# Patient Record
Sex: MALE | Race: ASIAN | Employment: OTHER | URBAN - METROPOLITAN AREA
[De-identification: names, ages, dates, MRNs, and addresses within clinical notes are randomized per-mention and may not be internally consistent; named-entity substitution may affect disease eponyms.]

---

## 2017-10-04 PROCEDURE — 84153 ASSAY OF PSA TOTAL: CPT | Performed by: INTERNAL MEDICINE

## 2017-10-05 PROBLEM — R73.09 ELEVATED GLUCOSE: Status: ACTIVE | Noted: 2017-10-05

## 2017-10-05 PROBLEM — E66.3 OVERWEIGHT (BMI 25.0-29.9): Status: ACTIVE | Noted: 2017-10-05

## 2018-11-02 PROCEDURE — 84153 ASSAY OF PSA TOTAL: CPT | Performed by: INTERNAL MEDICINE

## 2019-12-12 PROCEDURE — 88305 TISSUE EXAM BY PATHOLOGIST: CPT | Performed by: INTERNAL MEDICINE

## 2021-06-11 PROBLEM — E11.9 TYPE 2 DIABETES MELLITUS WITHOUT COMPLICATION, WITHOUT LONG-TERM CURRENT USE OF INSULIN (HCC): Status: ACTIVE | Noted: 2017-10-05

## 2024-04-09 ENCOUNTER — APPOINTMENT (OUTPATIENT)
Dept: CT IMAGING | Facility: HOSPITAL | Age: 64
End: 2024-04-09
Attending: EMERGENCY MEDICINE
Payer: COMMERCIAL

## 2024-04-09 ENCOUNTER — APPOINTMENT (OUTPATIENT)
Dept: GENERAL RADIOLOGY | Facility: HOSPITAL | Age: 64
End: 2024-04-09
Payer: COMMERCIAL

## 2024-04-09 ENCOUNTER — HOSPITAL ENCOUNTER (INPATIENT)
Facility: HOSPITAL | Age: 64
LOS: 2 days | Discharge: HOME | End: 2024-04-11
Attending: EMERGENCY MEDICINE | Admitting: HOSPITALIST
Payer: COMMERCIAL

## 2024-04-09 DIAGNOSIS — I24.9 ACS (ACUTE CORONARY SYNDROME) (HCC): Primary | ICD-10-CM

## 2024-04-09 PROBLEM — R79.89 AZOTEMIA: Status: ACTIVE | Noted: 2024-04-09

## 2024-04-09 PROBLEM — R73.9 HYPERGLYCEMIA: Status: ACTIVE | Noted: 2024-04-09

## 2024-04-09 LAB
ALBUMIN SERPL-MCNC: 4 G/DL (ref 3.4–5)
ALBUMIN/GLOB SERPL: 1.3 {RATIO} (ref 1–2)
ALP LIVER SERPL-CCNC: 37 U/L
ALT SERPL-CCNC: 42 U/L
ANION GAP SERPL CALC-SCNC: 9 MMOL/L (ref 0–18)
APTT PPP: 25.3 SECONDS (ref 23.3–35.6)
APTT PPP: 32.9 SECONDS (ref 23.3–35.6)
AST SERPL-CCNC: 27 U/L (ref 15–37)
ATRIAL RATE: 87 BPM
BASOPHILS # BLD AUTO: 0.05 X10(3) UL (ref 0–0.2)
BASOPHILS NFR BLD AUTO: 0.7 %
BILIRUB SERPL-MCNC: 0.8 MG/DL (ref 0.1–2)
BUN BLD-MCNC: 19 MG/DL (ref 9–23)
CALCIUM BLD-MCNC: 10.3 MG/DL (ref 8.5–10.1)
CHLORIDE SERPL-SCNC: 104 MMOL/L (ref 98–112)
CHOLEST SERPL-MCNC: 155 MG/DL (ref ?–200)
CO2 SERPL-SCNC: 24 MMOL/L (ref 21–32)
CREAT BLD-MCNC: 0.7 MG/DL
EGFRCR SERPLBLD CKD-EPI 2021: 104 ML/MIN/1.73M2 (ref 60–?)
EOSINOPHIL # BLD AUTO: 0.11 X10(3) UL (ref 0–0.7)
EOSINOPHIL NFR BLD AUTO: 1.6 %
ERYTHROCYTE [DISTWIDTH] IN BLOOD BY AUTOMATED COUNT: 12 %
GLOBULIN PLAS-MCNC: 3 G/DL (ref 2.8–4.4)
GLUCOSE BLD-MCNC: 102 MG/DL (ref 70–99)
GLUCOSE BLD-MCNC: 116 MG/DL (ref 70–99)
GLUCOSE BLD-MCNC: 177 MG/DL (ref 70–99)
HCT VFR BLD AUTO: 49.5 %
HDLC SERPL-MCNC: 61 MG/DL (ref 40–59)
HGB BLD-MCNC: 17.8 G/DL
IMM GRANULOCYTES # BLD AUTO: 0.01 X10(3) UL (ref 0–1)
IMM GRANULOCYTES NFR BLD: 0.1 %
LDLC SERPL CALC-MCNC: 78 MG/DL (ref ?–100)
LYMPHOCYTES # BLD AUTO: 2 X10(3) UL (ref 1–4)
LYMPHOCYTES NFR BLD AUTO: 29 %
MCH RBC QN AUTO: 31.1 PG (ref 26–34)
MCHC RBC AUTO-ENTMCNC: 36 G/DL (ref 31–37)
MCV RBC AUTO: 86.4 FL
MONOCYTES # BLD AUTO: 0.58 X10(3) UL (ref 0.1–1)
MONOCYTES NFR BLD AUTO: 8.4 %
MRSA DNA SPEC QL NAA+PROBE: NEGATIVE
NEUTROPHILS # BLD AUTO: 4.14 X10 (3) UL (ref 1.5–7.7)
NEUTROPHILS # BLD AUTO: 4.14 X10(3) UL (ref 1.5–7.7)
NEUTROPHILS NFR BLD AUTO: 60.2 %
NONHDLC SERPL-MCNC: 94 MG/DL (ref ?–130)
OSMOLALITY SERPL CALC.SUM OF ELEC: 287 MOSM/KG (ref 275–295)
P AXIS: 26 DEGREES
P-R INTERVAL: 184 MS
PLATELET # BLD AUTO: 206 10(3)UL (ref 150–450)
POTASSIUM SERPL-SCNC: 3.8 MMOL/L (ref 3.5–5.1)
PROT SERPL-MCNC: 7 G/DL (ref 6.4–8.2)
Q-T INTERVAL: 374 MS
QRS DURATION: 106 MS
QTC CALCULATION (BEZET): 450 MS
R AXIS: -54 DEGREES
RBC # BLD AUTO: 5.73 X10(6)UL
SODIUM SERPL-SCNC: 137 MMOL/L (ref 136–145)
T AXIS: 47 DEGREES
TRIGL SERPL-MCNC: 85 MG/DL (ref 30–149)
TROPONIN I SERPL HS-MCNC: 1102 NG/L
TROPONIN I SERPL HS-MCNC: 950 NG/L
VENTRICULAR RATE: 87 BPM
VLDLC SERPL CALC-MCNC: 13 MG/DL (ref 0–30)
WBC # BLD AUTO: 6.9 X10(3) UL (ref 4–11)

## 2024-04-09 PROCEDURE — 82962 GLUCOSE BLOOD TEST: CPT

## 2024-04-09 PROCEDURE — 99285 EMERGENCY DEPT VISIT HI MDM: CPT

## 2024-04-09 PROCEDURE — 84484 ASSAY OF TROPONIN QUANT: CPT | Performed by: INTERNAL MEDICINE

## 2024-04-09 PROCEDURE — 80053 COMPREHEN METABOLIC PANEL: CPT | Performed by: EMERGENCY MEDICINE

## 2024-04-09 PROCEDURE — 85730 THROMBOPLASTIN TIME PARTIAL: CPT | Performed by: EMERGENCY MEDICINE

## 2024-04-09 PROCEDURE — 84484 ASSAY OF TROPONIN QUANT: CPT | Performed by: EMERGENCY MEDICINE

## 2024-04-09 PROCEDURE — 93005 ELECTROCARDIOGRAM TRACING: CPT

## 2024-04-09 PROCEDURE — 93010 ELECTROCARDIOGRAM REPORT: CPT

## 2024-04-09 PROCEDURE — 85025 COMPLETE CBC W/AUTO DIFF WBC: CPT

## 2024-04-09 PROCEDURE — 96365 THER/PROPH/DIAG IV INF INIT: CPT

## 2024-04-09 PROCEDURE — 85025 COMPLETE CBC W/AUTO DIFF WBC: CPT | Performed by: EMERGENCY MEDICINE

## 2024-04-09 PROCEDURE — 71045 X-RAY EXAM CHEST 1 VIEW: CPT

## 2024-04-09 PROCEDURE — 87641 MR-STAPH DNA AMP PROBE: CPT | Performed by: INTERNAL MEDICINE

## 2024-04-09 PROCEDURE — 71275 CT ANGIOGRAPHY CHEST: CPT | Performed by: EMERGENCY MEDICINE

## 2024-04-09 PROCEDURE — 80053 COMPREHEN METABOLIC PANEL: CPT

## 2024-04-09 PROCEDURE — 85730 THROMBOPLASTIN TIME PARTIAL: CPT | Performed by: INTERNAL MEDICINE

## 2024-04-09 PROCEDURE — 96366 THER/PROPH/DIAG IV INF ADDON: CPT

## 2024-04-09 PROCEDURE — 84484 ASSAY OF TROPONIN QUANT: CPT

## 2024-04-09 PROCEDURE — 80061 LIPID PANEL: CPT | Performed by: EMERGENCY MEDICINE

## 2024-04-09 RX ORDER — ATORVASTATIN CALCIUM 20 MG/1
20 TABLET, FILM COATED ORAL NIGHTLY
Status: DISCONTINUED | OUTPATIENT
Start: 2024-04-09 | End: 2024-04-11

## 2024-04-09 RX ORDER — DEXTROSE MONOHYDRATE 25 G/50ML
50 INJECTION, SOLUTION INTRAVENOUS
Status: DISCONTINUED | OUTPATIENT
Start: 2024-04-09 | End: 2024-04-11

## 2024-04-09 RX ORDER — NICOTINE POLACRILEX 4 MG
30 LOZENGE BUCCAL
Status: DISCONTINUED | OUTPATIENT
Start: 2024-04-09 | End: 2024-04-11

## 2024-04-09 RX ORDER — SODIUM CHLORIDE 9 MG/ML
INJECTION, SOLUTION INTRAVENOUS ONCE
Status: COMPLETED | OUTPATIENT
Start: 2024-04-09 | End: 2024-04-09

## 2024-04-09 RX ORDER — LISINOPRIL 20 MG/1
20 TABLET ORAL DAILY
Status: DISCONTINUED | OUTPATIENT
Start: 2024-04-09 | End: 2024-04-11

## 2024-04-09 RX ORDER — ASPIRIN 81 MG/1
81 TABLET, CHEWABLE ORAL DAILY
Status: DISCONTINUED | OUTPATIENT
Start: 2024-04-10 | End: 2024-04-11

## 2024-04-09 RX ORDER — NICOTINE POLACRILEX 4 MG
15 LOZENGE BUCCAL
Status: DISCONTINUED | OUTPATIENT
Start: 2024-04-09 | End: 2024-04-11

## 2024-04-09 RX ORDER — HEPARIN SODIUM 1000 [USP'U]/ML
30 INJECTION, SOLUTION INTRAVENOUS; SUBCUTANEOUS ONCE
Status: COMPLETED | OUTPATIENT
Start: 2024-04-09 | End: 2024-04-09

## 2024-04-09 RX ORDER — ONDANSETRON 2 MG/ML
4 INJECTION INTRAMUSCULAR; INTRAVENOUS EVERY 4 HOURS PRN
Status: ACTIVE | OUTPATIENT
Start: 2024-04-09 | End: 2024-04-09

## 2024-04-09 RX ORDER — HEPARIN SODIUM AND DEXTROSE 10000; 5 [USP'U]/100ML; G/100ML
1000 INJECTION INTRAVENOUS ONCE
Status: COMPLETED | OUTPATIENT
Start: 2024-04-09 | End: 2024-04-09

## 2024-04-09 RX ORDER — MORPHINE SULFATE 4 MG/ML
4 INJECTION, SOLUTION INTRAMUSCULAR; INTRAVENOUS EVERY 30 MIN PRN
Status: ACTIVE | OUTPATIENT
Start: 2024-04-09 | End: 2024-04-09

## 2024-04-09 RX ORDER — HEPARIN SODIUM 1000 [USP'U]/ML
5000 INJECTION, SOLUTION INTRAVENOUS; SUBCUTANEOUS ONCE
Status: COMPLETED | OUTPATIENT
Start: 2024-04-09 | End: 2024-04-09

## 2024-04-09 RX ORDER — HEPARIN SODIUM AND DEXTROSE 10000; 5 [USP'U]/100ML; G/100ML
INJECTION INTRAVENOUS CONTINUOUS
Status: DISCONTINUED | OUTPATIENT
Start: 2024-04-09 | End: 2024-04-10

## 2024-04-09 RX ORDER — ASPIRIN 81 MG/1
324 TABLET, CHEWABLE ORAL ONCE
Status: COMPLETED | OUTPATIENT
Start: 2024-04-09 | End: 2024-04-09

## 2024-04-09 RX ORDER — IOHEXOL 350 MG/ML
100 INJECTION, SOLUTION INTRAVENOUS
Status: COMPLETED | OUTPATIENT
Start: 2024-04-09 | End: 2024-04-09

## 2024-04-09 RX ORDER — NITROGLYCERIN 20 MG/100ML
INJECTION INTRAVENOUS CONTINUOUS
Status: DISCONTINUED | OUTPATIENT
Start: 2024-04-09 | End: 2024-04-10

## 2024-04-09 NOTE — CONSULTS
[facility] Cardiology Consultation    Jennifer Barrios Patient Status:  Emergency    10/19/1960 MRN [id number]   Location [facility] EMERGENCY DEPARTMENT Attending Melissa Esparza MD   Hosp Day # 0 PCP Ronan Escobedo MD     Reason for Consultation:  chest pain      History of Present Illness:  Jennifer Barrios is a a(n) 63 year old male. He has CAD, s/p NSTEMI, ---> ANGELINA mid LAD; Hypertension , Dyslipidemia .  He reports chest pain, like a pressure, with effort (exercing on a stationary bike) for last 3 weeks.  Feels better when he rests. No rest pain. No dyspnea.      History:  Past Medical History:   Diagnosis Date    Anxiety     CAD (coronary artery disease)     s/p MI with stent.  nuclear stress test (), small area ischemia    Calculus of kidney     Diabetes mellitus (HCC) 10/05/2017    Hyperlipidemia     Hypertension     Overweight (BMI 25.0-29.9) 10/5/2017    Personal history of colonic polyps     multiple     Past Surgical History:   Procedure Laterality Date    ANGIOPLASTY (CORONARY)  13    Promus ANGELINA to proximal/midportion of the left anterior descending    COLONOSCOPY  14  -NICK Wisdom    2 adenomas, diverticuli, hemorrhoids. repeat .    COLONOSCOPY  19= Polyp    Repeat     COLONOSCOPY N/A 2019    Procedure: COLONOSCOPY, POSSIBLE BIOPSY, POSSIBLE POLYPECTOMY 52475;  Surgeon: Arvind Santoyo MD;  Location: [facility]    COLONOSCOPY,REMV LESN,SNARE  10/31/2011    NICK Wisdom. multiple adenomas (6), hemorrhoids, repeat .    COLONOSCOPY,REMV LESN,SNARE N/A 2014    Procedure: ESOPHAGOGASTRODUODENOSCOPY, COLONOSCOPY, POSSIBLE BIOPSY, POSSIBLE POLYPECTOMY 87742,66149;  Surgeon: Dario Wisdom MD;  Location: [facility]    SPECIAL SERVICE OR REPORT  2007    [facility]     STENT PLACEMT RETRO CAROTID  2013    UPPER GI ENDOSCOPY - REFERRAL  14  -NICK Wisdom    normal EGD    UPPER GI ENDOSCOPY,DIAGNOSIS N/A 2014    Procedure: ESOPHAGOGASTRODUODENOSCOPY,  COLONOSCOPY, POSSIBLE BIOPSY, POSSIBLE POLYPECTOMY 14396,55321;  Surgeon: Dario Wisdom MD;  Location: [facility] [place], [facility]     Family History   Problem Relation Age of Onset    Hypertension Father     Lipids Father          Allergies:  No Known Allergies    Medications:   ED initial dose heparin  1,000 Units/hr Intravenous Once       Continuous Infusions:   continuous dose heparin      nitroGLYCERIN in dextrose 5% 5 mcg/min (04/09/24 1538)       Social History:   reports that he has quit smoking. His smoking use included cigarettes. He has a 10 pack-year smoking history. He has never used smokeless tobacco. He reports current alcohol use. He reports that he does not use drugs.    Review of Systems:  All systems were reviewed and are negative except as described above in HPI.    Physical Exam:      Wt Readings from Last 3 Encounters:   04/09/24 200 lb (90.7 kg)   11/02/21 198 lb (89.8 kg)   06/11/21 196 lb (88.9 kg)       Vitals:    04/09/24 1357 04/09/24 1515 04/09/24 1545 04/09/24 1600   BP: (!) 166/84   (!) 131/100   Pulse: 91 89 83 85   Resp: 18 17 25 24   Temp: 97.8 °F (36.6 °C)      TempSrc: Oral      SpO2: 97% 97% 98% 99%   Weight: 200 lb (90.7 kg)      Height: 5' 9\" (1.753 m)          Temp:  [97.8 °F (36.6 °C)] 97.8 °F (36.6 °C)  Pulse:  [83-91] 85  Resp:  [17-25] 24  BP: (131-166)/() 131/100  SpO2:  [97 %-99 %] 99 %    Temp: 97.8 °F (36.6 °C)  Pulse: 85  Resp: 24  BP: 131/100      General:  Appears comfortable  HEENT: No focal deficits.  Neck: No JVD, carotids 2+ no bruits.  Cardiac: Regular S1S2.  No S3, S4, rub, click.  No murmur.  Lungs: Clear to auscultation and percussion.  Abdomen: Soft, non-tender.   Extremities: No LE edema.  No clubbing or cyanosis  Neurologic: Alert and oriented, normal affect.  Skin: Warm and dry.     Labs:      HEM:  Recent Labs   Lab 04/09/24  1407   WBC 6.9   HGB 17.8*   HCT 49.5   .0       Chem:  Recent Labs   Lab 04/09/24  1407      K 3.8       CO2 24.0   BUN 19   CREATSERUM 0.70   ALT 42   AST 27   ALB 4.0       No results for input(s): \"INR\" in the last 168 hours.                  Lab Results   Component Value Date    TROP 1.420 (H) 06/19/2013    TROP 1.890 (H) 06/19/2013    TROP 1.950 (H) 06/18/2013         Invalid input(s): \"PBNPML\"                 Telemetry: SR    Laboratories and Data:  Diagnostics:    EKG, 4/9/2024:  NSR, modest anterior ST elevation    CXR, 4/9/2024:  CONCLUSION:       Stable cardiac and mediastinal contours.  Stable prominence of the central pulmonary vasculature without overt pulmonary edema.  No airspace consolidation.  The pleural spaces are clear.               Impression:    1.  Effort-related chest pain. Elevated troponin    2. CAD, s/p NSTEMI, 2013---> ANGELINA mid LAD    3. Dyslipidemia     4. Hypertension      5. Abnormal CTA chest: Spiculated consolidation in the right upper lobe may be due to infectious process.  Consider pulmonary consultation    Recommend:    1.  Cath/PCI in am. Procedure discussed  2. Pulm consult for abnl CTA  3. IV heparin, asa, statin, BB          Mikel Gant MD  4/9/2024  4:20 PM

## 2024-04-09 NOTE — ED PROVIDER NOTES
Patient Seen in: [facility] Emergency Department      History     Chief Complaint   Patient presents with    Chest Pain Angina     Stated Complaint: chest pain while exercising.  pt sent from     Subjective:   HPI  Stent  63-year-old with a history of coronary artery disease status post stent to the LAD in 2013, diabetes, hypertension, high cholesterol, kidney stones presents for evaluation of chest discomfort.  Patient has been having exertional chest pressure for 3-4 weeks. His symptoms are similar but less severe than what he experienced 11 years ago with his heart attack. Currently exercises 5-6 days/week. Doesn't have symptoms with weightlifting, but will consistently have symptoms when he does a spin class. Had the symptoms during his class this morning, but was able to finish the class and ride 24 miles. Symptoms resolved after he stopped riding and have not returned. No shortness of breath, diaphoresis. Pressure does not radiate. Has been taking his baby ASA as directed. Wife notes that he has had BLE edema ongoing that has not been evaluated.  He did have a 15-hour flight 40 days ago and both of his legs were discolored when he came back, no unilateral calf swelling.  He went to urgent care today and was sent to the ER for further evaluation of his symptoms.  EKG from the urgent care was reviewed and is similar to initial EKG here in the ER.  Records reviewed  Objective:   Past Medical History:   Diagnosis Date    Anxiety     CAD (coronary artery disease)     s/p MI with stent.  nuclear stress test (6/16), small area ischemia    Calculus of kidney     Diabetes mellitus (HCC) 10/05/2017    Hyperlipidemia     Hypertension     Overweight (BMI 25.0-29.9) 10/5/2017    Personal history of colonic polyps 2011    multiple              Past Surgical History:   Procedure Laterality Date    ANGIOPLASTY (CORONARY)  6/19/13    Promus ANGELINA to proximal/midportion of the left anterior descending    COLONOSCOPY   9/25/14  -NICK Wisdom    2 adenomas, diverticuli, hemorrhoids. repeat 2019.    COLONOSCOPY  12/12/19= Polyp    Repeat 2024    COLONOSCOPY N/A 12/12/2019    Procedure: COLONOSCOPY, POSSIBLE BIOPSY, POSSIBLE POLYPECTOMY 96759;  Surgeon: Arvind Santoyo MD;  Location: [facility]    COLONOSCOPY,REMV LESN,SNARE  10/31/2011    NICK Wisdom. multiple adenomas (6), hemorrhoids, repeat 2014.    COLONOSCOPY,REMV LESN,SNARE N/A 9/25/2014    Procedure: ESOPHAGOGASTRODUODENOSCOPY, COLONOSCOPY, POSSIBLE BIOPSY, POSSIBLE POLYPECTOMY 49719,82639;  Surgeon: Dario Wisdom MD;  Location: [facility]    SPECIAL SERVICE OR REPORT  7/18/2007    [facility]     STENT PLACEMT RETRO CAROTID  2013    UPPER GI ENDOSCOPY - REFERRAL  9/25/14  -NICK Wisdom    normal EGD    UPPER GI ENDOSCOPY,DIAGNOSIS N/A 9/25/2014    Procedure: ESOPHAGOGASTRODUODENOSCOPY, COLONOSCOPY, POSSIBLE BIOPSY, POSSIBLE POLYPECTOMY 70145,73831;  Surgeon: Dario Wisdom MD;  Location: [facility]                Social History     Socioeconomic History    Marital status:     Number of children: 3   Occupational History    Occupation:    Tobacco Use    Smoking status: Former     Packs/day: 1.00     Years: 10.00     Additional pack years: 0.00     Total pack years: 10.00     Types: Cigarettes    Smokeless tobacco: Never   Vaping Use    Vaping Use: Never used   Substance and Sexual Activity    Alcohol use: Yes     Alcohol/week: 0.0 - 1.0 standard drinks of alcohol    Drug use: No              Review of Systems    Positive for stated complaint: chest pain while exercising.  pt sent from IC  Other systems are as noted in HPI.  Constitutional and vital signs reviewed.      All other systems reviewed and negative except as noted above.    Physical Exam     ED Triage Vitals [04/09/24 1357]   BP (!) 166/84   Pulse 91   Resp 18   Temp 97.8 °F (36.6 °C)   Temp src Oral   SpO2 97 %   O2 Device None (Room air)       Current:BP (!) 131/100    Pulse 85   Temp 97.8 °F (36.6 °C) (Oral)   Resp 24   Ht 175.3 cm (5' 9\")   Wt 90.7 kg   SpO2 99%   BMI 29.53 kg/m²         Physical Exam    General: Patient is awake, alert in no acute distress.   HEENT:   Sclera are not icteric.  Conjunctivae within normal limits.  Mucous members are moist.   Cardiovascular: Regular rate and rhythm, normal S1-S2.  Respiratory: Lungs are clear to auscultation bilaterally.   Extremities: No edema.  No unilateral calf swelling or calf tenderness to palpation.  Skin: warm and dry, no diaphoresis    ED Course     Labs Reviewed   COMP METABOLIC PANEL (14) - Abnormal; Notable for the following components:       Result Value    Glucose 116 (*)     Calcium, Total 10.3 (*)     Alkaline Phosphatase 37 (*)     All other components within normal limits   TROPONIN I HIGH SENSITIVITY - Abnormal; Notable for the following components:    Troponin I (High Sensitivity) 950 (*)     All other components within normal limits   LIPID PANEL - Abnormal; Notable for the following components:    HDL Cholesterol 61 (*)     All other components within normal limits   CBC W/ DIFFERENTIAL - Abnormal; Notable for the following components:    RBC 5.73 (*)     HGB 17.8 (*)     All other components within normal limits   PTT, ACTIVATED - Normal   CBC WITH DIFFERENTIAL WITH PLATELET    Narrative:     The following orders were created for panel order CBC With Differential With Platelet.  Procedure                               Abnormality         Status                     ---------                               -----------         ------                     CBC W/ DIFFERENTIAL[[id number]]          Abnormal            Final result                 Please view results for these tests on the individual orders.   RAINBOW DRAW LAVENDER   RAINBOW DRAW LIGHT GREEN   RAINBOW DRAW BLUE     EKG #1    Rate, intervals and axes as noted on EKG Report.  Rate: 87  Rhythm: Sinus Rhythm  Reading: Prominence of the anterior ST  segments without reciprocal changes, no dysrhythmia, normal intervals including QTc 450.  Anterior ST segment slightly prominent compared with prior EKG 2016.         EKG #2    Rate, intervals and axes as noted on EKG Report.  Rate: 90  Rhythm: Sinus Rhythm  Reading: Continued prominence of the ST segments V1 through V3, no reciprocal changes, no ST depression, no dysrhythmia, no significant change compared with EKG #1    Chest x-ray: I personally reviewed the radiographs and my individual interpretation shows no pneumothorax.  I also reviewed the official report which showed prominence of the central pulmonary vasculature without overt pulmonary edema.  CTA chest: No PE.  Spiculated consolidation in the right upper lobe could be infectious process versus malignancy        Heparin bolus and drip ordered per protocol.  Nitro drip ordered.  Aspirin ordered.         MDM      History is obtained from: Patient's wife    Previous records reviewed as noted in HPI    Differential includes, but is not limited to, ACS, STEMI, PE    Review of any laboratory testing: Troponin elevated at 950.  CBC unremarkable.  CMP with minimal hyperglycemia.      Review of any radiographic studies: Chest x-ray unremarkable.  CTA chest negative for PE, does demonstrate a spiculated consolidation in the right upper lobe    Shared decision making with the patient.  Given patient's symptoms, concern for ACS.  No EKG changes to suggest STEMI and patient is asymptomatic currently.  Given his recent travel and leg swelling he did have a CTA of the chest which was negative for PE.  He will be admitted for cardiology consultation.    Consultants utilized: Spoke with Dr. Mai from the hospitalist service will admit him.  I spoke with Dr. Gant from cardiology who saw the patient in the ER.         Patient was seen immediately upon arrival due to a high probability of sudden, clinically significant, or life threatening deterioration of the  patient's clinical status due to concern for ACS.  The patient required my time at the bedside performing direct personal management, the absence of which would likely result in sudden, clinically significant or life threatening deterioration of  clinical condition. Necessary history is obtained from patient and his wife. The patient required my constant attention. Time was spent ordering, reviewing, and interpreting ancillary testing and imaging. The patient was frequently reevaluated, and I made frequent checks of  vital signs and monitor. Nursing notes were reviewed.     The patient and family were provided with frequent updates of the patient's clinical status and the plan of care.      Total critical care time was 40 minutes exclusive of separately reportable procedures: None.                              Medical Decision Making      Disposition and Plan     Clinical Impression:  1. ACS (acute coronary syndrome) ([facility])         Disposition:  Admit  4/9/2024  4:37 pm    Follow-up:  No follow-up provider specified.        Medications Prescribed:  Current Discharge Medication List                            Hospital Problems       Present on Admission  Date Reviewed: 11/2/2021            ICD-10-CM Noted POA    * (Principal) ACS (acute coronary syndrome) ([facility]) I24.9 4/9/2024 Unknown    Azotemia R79.89 4/9/2024 Yes    Hyperglycemia R73.9 4/9/2024 Yes

## 2024-04-09 NOTE — H&P
[facility] Hospitalist H&P/Consult note       CC:   Chief Complaint   Patient presents with    Chest Pain Angina        PCP: Ronan Escobedo MD    History of Present Illness: Patient is a 63 year old male with PMH sig for cad with prior MI, stetns, dm2, htn , hld, here for chest pressure  Ongoing for 3-4 weeks, occurs with exertion (biking), goes away with rest. No pain with lighter exertion - ie walking / climbing stairs , etc. No sob, no n/v. No diaphoresis.       PMH  Past Medical History:   Diagnosis Date    Anxiety     CAD (coronary artery disease)     s/p MI with stent.  nuclear stress test (6/16), small area ischemia    Calculus of kidney     Diabetes mellitus (HCC) 10/05/2017    Hyperlipidemia     Hypertension     Overweight (BMI 25.0-29.9) 10/5/2017    Personal history of colonic polyps 2011    multiple        PSH  Past Surgical History:   Procedure Laterality Date    ANGIOPLASTY (CORONARY)  6/19/13    Promus ANGELINA to proximal/midportion of the left anterior descending    COLONOSCOPY  9/25/14  -NICK Wisdom    2 adenomas, diverticuli, hemorrhoids. repeat 2019.    COLONOSCOPY  12/12/19= Polyp    Repeat 2024    COLONOSCOPY N/A 12/12/2019    Procedure: COLONOSCOPY, POSSIBLE BIOPSY, POSSIBLE POLYPECTOMY 55632;  Surgeon: Arvind Santoyo MD;  Location: [facility]    COLONOSCOPY,REMV LESN,SNARE  10/31/2011    NICK Wisdom. multiple adenomas (6), hemorrhoids, repeat 2014.    COLONOSCOPY,REMV LESN,SNARE N/A 9/25/2014    Procedure: ESOPHAGOGASTRODUODENOSCOPY, COLONOSCOPY, POSSIBLE BIOPSY, POSSIBLE POLYPECTOMY 61872,71869;  Surgeon: Dario Wisdom MD;  Location: [facility]    SPECIAL SERVICE OR REPORT  7/18/2007    [facility]     STENT PLACEMT RETRO CAROTID  2013    UPPER GI ENDOSCOPY - REFERRAL  9/25/14  -NICK Wisdom    normal EGD    UPPER GI ENDOSCOPY,DIAGNOSIS N/A 9/25/2014    Procedure: ESOPHAGOGASTRODUODENOSCOPY, COLONOSCOPY, POSSIBLE BIOPSY, POSSIBLE POLYPECTOMY 64851,84701;  Surgeon: Dario Wisdom MD;  Location: [facility]  SURGICAL [facility]        ALL:  No Known Allergies     Home Medications:  Outpatient Medications Marked as Taking for the 4/9/24 encounter (Hospital Encounter)   Medication Sig Dispense Refill    atorvastatin 20 MG Oral Tab Take 1 tablet (20 mg total) by mouth nightly. TAKE AT BEDTIME 90 tablet 3    lisinopril 10 MG Oral Tab Take 1 tablet (10 mg total) by mouth daily. 90 tablet 3    metoprolol succinate 25 MG Oral Tablet 24 Hr Take 1 tablet (25 mg total) by mouth daily. 90 tablet 3    aspirin 81 MG Oral Tab Take 1 tablet (81 mg total) by mouth daily.      POTASSIUM CITRATE 1 tablet by Does not apply route daily.      Omega-3 Fatty Acids (FISH OIL) 1000 MG Oral Cap Take 1,000 mg by mouth daily.      B-COMPLEX OR daily      MAGNESIUM 250 MG OR TABS daily           Soc Hx  Social History     Tobacco Use    Smoking status: Former     Packs/day: 1.00     Years: 10.00     Additional pack years: 0.00     Total pack years: 10.00     Types: Cigarettes    Smokeless tobacco: Never   Substance Use Topics    Alcohol use: Yes     Alcohol/week: 0.0 - 1.0 standard drinks of alcohol        Fam Hx  Family History   Problem Relation Age of Onset    Hypertension Father     Lipids Father        Review of Systems  Comprehensive ROS reviewed and negative except for what's stated above.  Including negative for chest pain, shortness of breath, syncope.       OBJECTIVE:  /77   Pulse 85   Temp 97.8 °F (36.6 °C) (Oral)   Resp 22   Ht 5' 9\" (1.753 m)   Wt 200 lb (90.7 kg)   SpO2 96%   BMI 29.53 kg/m²   General:  Alert, no distress, appears stated age.   Head:  Normocephalic, without obvious abnormality, atraumatic.   Eyes:  Sclera anicteric, No conjunctival pallor, EOMs intact.    Throat: dry   Neck: Supple    Lungs:   Clear to auscultation bilaterally. Normal effort   Chest wall:  No tenderness or deformity.   Heart:  Regular rate and rhythm    Abdomen:   Soft, NT/ND    Extremities: Atraumatic, no cyanosis or edema.   Skin: No  visible rashes or lesions.    Neurologic: Normal strength, no focal deficit appreciated     Diagnostic Data:    CBC/Chem  Recent Labs   Lab 04/09/24  1407   WBC 6.9   HGB 17.8*   MCV 86.4   .0       Recent Labs   Lab 04/09/24  1407      K 3.8      CO2 24.0   BUN 19   CREATSERUM 0.70   *   CA 10.3*       Recent Labs   Lab 04/09/24  1407   ALT 42   AST 27   ALB 4.0       No results for input(s): \"TROP\" in the last 168 hours.    Additional Diagnostics: ECG: sinus    CXR: image personally reviewed     Radiology: CT ANGIOGRAPHY, CHEST (CPT=71275)    Result Date: 4/9/2024  PROCEDURE:  CT ANGIOGRAPHY, CHEST (CPT=71275)  COMPARISON:  [facility] , XR, XR CHEST AP PORTABLE  (CPT=71045), 4/09/2024, 2:33 PM.  INDICATIONS:  chest pain while exercising.  pt sent from IC  TECHNIQUE:  IV contrast-enhanced multislice CT angiography is performed through the pulmonary arterial anatomy. 3D volume renderings are generated.  Dose reduction techniques were used. Dose information is transmitted to the ACR (American College of Radiology) NRDR (National Radiology Data Registry) which includes the Dose Index Registry.  PATIENT STATED HISTORY:(As transcribed by Technologist)  Patient is here for chest pressure while exercising.   CONTRAST USED:  100cc of Omnipaque 350  FINDINGS:  VASCULATURE:  No visible pulmonary arterial thrombus or attenuation.  THORACIC AORTA:  No aneurysm or visible dissection.  LUNGS:  Spiculated consolidation right upper lobe (image 73). MEDIASTINUM:  No adenopathy or mass.  PRACHI:  No mass or adenopathy.  CARDIAC:  Dense coronary arterial calcifications. PLEURA:  No mass or effusion.  CHEST WALL:  No mass or axillary adenopathy.  LIMITED ABDOMEN:  Gallstones in the gallbladder. BONES:  Diffuse idiopathic skeletal hyperostosis. OTHER:  Negative.             CONCLUSION:   1. No evidence of pulmonary embolus.  2. Spiculated consolidation in the right upper lobe may be due to infectious process.   However, malignancy is also of consideration.  A follow-up CT chest in 1-2 months to re-evaluate may be done.     LOCATION:  [facility]   Dictated by (CST): Berry Ward MD on 4/09/2024 at 4:26 PM     Finalized by (CST): Berry Ward MD on 4/09/2024 at 4:28 PM       XR CHEST AP PORTABLE  (CPT=71045)    Result Date: 4/9/2024  PROCEDURE:  XR CHEST AP PORTABLE  (CPT=71045)  TECHNIQUE:  AP chest radiograph was obtained.  COMPARISON:  [facility] , XR, XR CHEST AP PORTABLE  (CPT=71010), 6/18/2013, 4:27 PM.  INDICATIONS:  chest pain while exercising.  pt sent from IC  PATIENT STATED HISTORY: (As transcribed by Technologist)  Patient offered no additional history at this time.                 CONCLUSION:   Stable cardiac and mediastinal contours.  Stable prominence of the central pulmonary vasculature without overt pulmonary edema.  No airspace consolidation.  The pleural spaces are clear.   LOCATION:  [id number]      Dictated by (CST): Lauren Magdaleno MD on 4/09/2024 at 2:39 PM     Finalized by (CST): Lauren Magdaleno MD on 4/09/2024 at 2:40 PM          ASSESSMENT / PLAN:      Patient is a 63 year old male with PMH sig for cad with prior MI, stetns, dm2, htn , hld, here for chest pressure    Impression    -NSTEMI  -CAD with prior NSTEMI in 2013 and rasheeda to LAD  -HTN  -HLD    -abn CT chest    -DM 2  -obesity  -hypercalcemia   -elevated trop       Plan    *NSTEMI  -presenting chest pain, elevated trop  Plan for cath tomorrow  -on heparin / nitrog    *abn CT chest   -CTA done due to recent travel and ro PE, showing spiculated RUL consolidation. He denied any fevers / infectious symptoms, less likely infection. Will have pulm see    *hypercalcemia  -mild, trend    *dm  -iss / accuchecks     *chronic conditions  -home meds as able    Scds  Heparin     Further recommendations pending patient's clinical course. [facility] hospitalist to continue to follow patient while in house    Patient and/or patient's family given opportunity to ask  questions and note understanding and agreeing with therapeutic plan as outlined    Serg Barton Hospitalist  [phone]  Answering Service: [phone]

## 2024-04-09 NOTE — ED INITIAL ASSESSMENT (HPI)
States for the last 3-4 weeks pt has been having pressure in his chest during spin class. Hx of MI x10 years ago.

## 2024-04-10 ENCOUNTER — APPOINTMENT (OUTPATIENT)
Dept: INTERVENTIONAL RADIOLOGY/VASCULAR | Facility: HOSPITAL | Age: 64
End: 2024-04-10
Attending: INTERNAL MEDICINE
Payer: COMMERCIAL

## 2024-04-10 LAB
ANION GAP SERPL CALC-SCNC: 8 MMOL/L (ref 0–18)
APTT PPP: 40.9 SECONDS (ref 23.3–35.6)
BUN BLD-MCNC: 16 MG/DL (ref 9–23)
CALCIUM BLD-MCNC: 9.4 MG/DL (ref 8.5–10.1)
CHLORIDE SERPL-SCNC: 107 MMOL/L (ref 98–112)
CO2 SERPL-SCNC: 26 MMOL/L (ref 21–32)
CREAT BLD-MCNC: 0.75 MG/DL
EGFRCR SERPLBLD CKD-EPI 2021: 101 ML/MIN/1.73M2 (ref 60–?)
ERYTHROCYTE [DISTWIDTH] IN BLOOD BY AUTOMATED COUNT: 12.1 %
GLUCOSE BLD-MCNC: 116 MG/DL (ref 70–99)
GLUCOSE BLD-MCNC: 127 MG/DL (ref 70–99)
GLUCOSE BLD-MCNC: 133 MG/DL (ref 70–99)
GLUCOSE BLD-MCNC: 133 MG/DL (ref 70–99)
GLUCOSE BLD-MCNC: 90 MG/DL (ref 70–99)
HCT VFR BLD AUTO: 47.2 %
HGB BLD-MCNC: 16.7 G/DL
ISTAT ACTIVATED CLOTTING TIME: 266 SECONDS (ref 74–137)
MAGNESIUM SERPL-MCNC: 2.2 MG/DL (ref 1.6–2.6)
MCH RBC QN AUTO: 31 PG (ref 26–34)
MCHC RBC AUTO-ENTMCNC: 35.4 G/DL (ref 31–37)
MCV RBC AUTO: 87.7 FL
OSMOLALITY SERPL CALC.SUM OF ELEC: 295 MOSM/KG (ref 275–295)
PLATELET # BLD AUTO: 192 10(3)UL (ref 150–450)
POTASSIUM SERPL-SCNC: 4.4 MMOL/L (ref 3.5–5.1)
RBC # BLD AUTO: 5.38 X10(6)UL
SODIUM SERPL-SCNC: 141 MMOL/L (ref 136–145)
TROPONIN I SERPL HS-MCNC: 739 NG/L
WBC # BLD AUTO: 6.3 X10(3) UL (ref 4–11)

## 2024-04-10 PROCEDURE — 83735 ASSAY OF MAGNESIUM: CPT | Performed by: HOSPITALIST

## 2024-04-10 PROCEDURE — 027034Z DILATION OF CORONARY ARTERY, ONE ARTERY WITH DRUG-ELUTING INTRALUMINAL DEVICE, PERCUTANEOUS APPROACH: ICD-10-PCS | Performed by: INTERNAL MEDICINE

## 2024-04-10 PROCEDURE — B240ZZ3 ULTRASONOGRAPHY OF SINGLE CORONARY ARTERY, INTRAVASCULAR: ICD-10-PCS | Performed by: INTERNAL MEDICINE

## 2024-04-10 PROCEDURE — 92978 ENDOLUMINL IVUS OCT C 1ST: CPT | Performed by: INTERNAL MEDICINE

## 2024-04-10 PROCEDURE — 92972 PERQ TRLUML CORONRY LITHOTRP: CPT | Performed by: INTERNAL MEDICINE

## 2024-04-10 PROCEDURE — B2111ZZ FLUOROSCOPY OF MULTIPLE CORONARY ARTERIES USING LOW OSMOLAR CONTRAST: ICD-10-PCS | Performed by: INTERNAL MEDICINE

## 2024-04-10 PROCEDURE — 80048 BASIC METABOLIC PNL TOTAL CA: CPT | Performed by: HOSPITALIST

## 2024-04-10 PROCEDURE — 93458 L HRT ARTERY/VENTRICLE ANGIO: CPT | Performed by: INTERNAL MEDICINE

## 2024-04-10 PROCEDURE — 4A023N7 MEASUREMENT OF CARDIAC SAMPLING AND PRESSURE, LEFT HEART, PERCUTANEOUS APPROACH: ICD-10-PCS | Performed by: INTERNAL MEDICINE

## 2024-04-10 PROCEDURE — 99153 MOD SED SAME PHYS/QHP EA: CPT | Performed by: INTERNAL MEDICINE

## 2024-04-10 PROCEDURE — 85027 COMPLETE CBC AUTOMATED: CPT | Performed by: HOSPITALIST

## 2024-04-10 PROCEDURE — 99152 MOD SED SAME PHYS/QHP 5/>YRS: CPT | Performed by: INTERNAL MEDICINE

## 2024-04-10 PROCEDURE — B2151ZZ FLUOROSCOPY OF LEFT HEART USING LOW OSMOLAR CONTRAST: ICD-10-PCS | Performed by: INTERNAL MEDICINE

## 2024-04-10 PROCEDURE — 85730 THROMBOPLASTIN TIME PARTIAL: CPT | Performed by: INTERNAL MEDICINE

## 2024-04-10 PROCEDURE — 82962 GLUCOSE BLOOD TEST: CPT

## 2024-04-10 PROCEDURE — 84484 ASSAY OF TROPONIN QUANT: CPT | Performed by: INTERNAL MEDICINE

## 2024-04-10 PROCEDURE — 85347 COAGULATION TIME ACTIVATED: CPT

## 2024-04-10 PROCEDURE — 02F03ZZ FRAGMENTATION IN CORONARY ARTERY, ONE ARTERY, PERCUTANEOUS APPROACH: ICD-10-PCS | Performed by: INTERNAL MEDICINE

## 2024-04-10 RX ORDER — LIDOCAINE HYDROCHLORIDE 10 MG/ML
INJECTION, SOLUTION EPIDURAL; INFILTRATION; INTRACAUDAL; PERINEURAL
Status: COMPLETED
Start: 2024-04-10 | End: 2024-04-10

## 2024-04-10 RX ORDER — VERAPAMIL HYDROCHLORIDE 2.5 MG/ML
INJECTION, SOLUTION INTRAVENOUS
Status: COMPLETED
Start: 2024-04-10 | End: 2024-04-10

## 2024-04-10 RX ORDER — NITROGLYCERIN 20 MG/100ML
INJECTION INTRAVENOUS
Status: COMPLETED
Start: 2024-04-10 | End: 2024-04-10

## 2024-04-10 RX ORDER — MIDAZOLAM HYDROCHLORIDE 1 MG/ML
INJECTION INTRAMUSCULAR; INTRAVENOUS
Status: COMPLETED
Start: 2024-04-10 | End: 2024-04-10

## 2024-04-10 RX ORDER — SODIUM CHLORIDE 9 MG/ML
INJECTION, SOLUTION INTRAVENOUS CONTINUOUS
Status: DISCONTINUED | OUTPATIENT
Start: 2024-04-10 | End: 2024-04-10

## 2024-04-10 RX ORDER — HEPARIN SODIUM 5000 [USP'U]/ML
INJECTION, SOLUTION INTRAVENOUS; SUBCUTANEOUS
Status: COMPLETED
Start: 2024-04-10 | End: 2024-04-10

## 2024-04-10 RX ORDER — ASPIRIN 81 MG/1
81 TABLET ORAL DAILY
Status: DISCONTINUED | OUTPATIENT
Start: 2024-04-11 | End: 2024-04-11

## 2024-04-10 NOTE — DIETARY NOTE
Clinical Nutrition    Dietitian consult received per cardiac rehab standing order. Pt to be educated by cardiac rehab staff and encouraged to attend outpatient classes taught by RD. RD available PRN.    Monika Lo MS, RD, LDN  Clinical Dietitian  Ext: [phone]

## 2024-04-10 NOTE — PROGRESS NOTES
[facility] Cardiology Progress Note        Jennifer TERESE Sophie Patient Status:  Inpatient    10/19/1960 MRN [id number]   [place] [id number] Attending Serg Mai,    Hosp Day # 1 PCP Ronan Escobedo MD     Subjective:  The patient denies  chest pain and shortness of breath.    Medications:   aspirin  81 mg Oral Daily    atorvastatin  20 mg Oral Nightly    lisinopril  20 mg Oral Daily    metoprolol tartrate  25 mg Oral TID Beta Blocker/Cardiac    insulin aspart  1-5 Units Subcutaneous TID AC and HS       Continuous Infusions:   continuous dose heparin 1,300 Units/hr (04/10/24 0447)    nitroGLYCERIN in dextrose 5% Stopped (24 1730)         Allergies:  No Known Allergies      Objective:        Intake/Output:      Intake/Output Summary (Last 24 hours) at 4/10/2024 0742  Last data filed at 4/10/2024 0646  Gross per 24 hour   Intake 777 ml   Output 1225 ml   Net -448 ml     Wt Readings from Last 3 Encounters:   24 197 lb 8.5 oz (89.6 kg)   21 198 lb (89.8 kg)   21 196 lb (88.9 kg)       Physical Exam:        Vitals:    04/10/24 0400 04/10/24 0500 04/10/24 0600 04/10/24 0700   BP: 98/55 107/61 99/52 (!) 89/68   BP Location: Left arm      Pulse: 68 65 67 71   Resp: 20 22 20 22   Temp: 97.5 °F (36.4 °C)      TempSrc: Temporal      SpO2: 96% 96% 96% 96%   Weight:       Height:           Temp:  [97.5 °F (36.4 °C)-98.8 °F (37.1 °C)] 97.5 °F (36.4 °C)  Pulse:  [63-91] 71  Resp:  [16-32] 22  BP: ()/() 89/68  SpO2:  [92 %-99 %] 96 %      Temp: 97.5 °F (36.4 °C)  Pulse: 71  Resp: 22  BP: 89/68  General:  Appears comfortable  HEENT: No focal deficits.  Neck: No JVD, carotids 2+ no bruits.  Cardiac: Regular S1S2.  No S3, S4, rub, click.  No murmur.  Lungs: Clear to auscultation and percussion.  Abdomen: Soft, non-tender.   Extremities: No LE edema.  No clubbing or cyanosis.    Neurologic: Alert and oriented, normal affect.  Skin: Warm and dry.            LABS:      HEM:  Recent Labs   Lab 04/09/24  1407 04/10/24  0406   WBC 6.9 6.3   HGB 17.8* 16.7   HCT 49.5 47.2   .0 192.0       Chem:  Recent Labs   Lab 04/09/24  1407 04/10/24  0407    141   K 3.8 4.4    107   CO2 24.0 26.0   BUN 19 16   CREATSERUM 0.70 0.75   CA 10.3* 9.4   MG  --  2.2   * 127*       Recent Labs   Lab 04/09/24  1407   ALT 42   AST 27   ALB 4.0       Recent Labs   Lab 04/09/24  1407 04/09/24  2038 04/10/24  0407   PTT 25.3 32.9 40.9*           Lab Results   Component Value Date    TROP 1.420 (H) 06/19/2013    TROP 1.890 (H) 06/19/2013    TROP 1.950 (H) 06/18/2013         Invalid input(s): \"PBNPML\"                       Diagnostics:   Telemetry: SR    EKG, 4/9/2024:  NSR, modest anterior ST elevation     CXR, 4/9/2024:  CONCLUSION:       Stable cardiac and mediastinal contours.  Stable prominence of the central pulmonary vasculature without overt pulmonary edema.  No airspace consolidation.  The pleural spaces are clear.        trop's: 950-->1102---> 739           Impression:     1.  Effort-related chest pain. Elevated troponins     2. CAD, s/p NSTEMI, 2013---> ANGELINA mid LAD     3. Dyslipidemia      4. Hypertension       5. Abnormal CTA chest: Spiculated consolidation in the right upper lobe may be due to infectious process.  Consider pulmonary consultation     Recommend:     1.  Cath/PCI today.  Procedure discussed. Will await opinion of pulm prior to taking him to lab  2. Pulm consult for abnl CTA  3. IV heparin, asa, statin, BB  4. Stop IV NTG given low bp;s       Mikel Gant MD  4/10/2024  7:42 AM

## 2024-04-10 NOTE — PLAN OF CARE
Pt's VSS. On RA. Denies chest pain or SOB. NPO since midnight. Voiding without difficulty. Heparin gtt per ACS protocol - increased gtt this am. Next PTT 10:47. Plan for heart cath later today.

## 2024-04-10 NOTE — PROCEDURES
[facility]       Jennifer Barrios Location: Cath Lab    CSN [id number] MRN [id number]   Admission Date 4/9/2024 Procedure Date 4/10/2024   Attending Physician Serg Mai DO Procedure Physician Prashant Barton MD         CARDIAC CATHETERIZATION/PERCUTANEOUS CORONARY INTERVENTION REPORT     PREOPERATIVE DIAGNOSIS:  chest pain, NSTEMI, hx of PCI to the LAD (2013)  POSTOPERATIVE DIAGNOSIS:  severe in-stent restenosis of the LAD stent.  PROCEDURE PERFORMED:  left heart catheterization, left ventriculogram, selective coronary angiography, IVUS guided intra-coronary lithotripsy and PCI to the mid LAD in-stent lesion       PROCEDURE:  The patient was brought to the cardiac catheterization lab in the fasting state.  Informed consent was obtained.  Moderate sedation was employed using a total of IV Versed 4mg and IV fentanyl 100mcg.  I directly observed the patient from 1001 to 1044, for a total of 43 minutes, and an independent trained observer was present and assisted in the monitoring of the patient's level of consciousness and physiological status, watching the heart rate, blood pressure, oximetry, and rhythm, in addition to total moderation time.      ACCESS/CATHETER PLACEMENT:  The right radial area was prepped and draped in a sterile manner and anesthetized with 2% lidocaine.  The right radial artery was accessed, and a 6-Micronesian, 11 cm sheath was placed.  Left and right selective coronary angiography was performed using a 6F Tiger4.0 catheter.  A pigtail catheter was used to cross the aortic valve, measure left ventricular pressure and perform a 30 degree CHURCHILL ventriculogram.  The catheter was pulled across the valve to assess for aortic stenosis.  At the conclusion of the study, the right radial artery hemostasis was performed using a radial band.         FINDINGS:      1.  Left heart catheterization:    Left ventricle: 98/7 mmHg  Aorta: 96/61/78 mmHg  Left ventriculogram demonstrated a LV ejection fraction of 55%  without mitral regurgitation; there is very mild anterior/apical hypokinesis.  There was no aortic stenosis upon pullback of the catheter.    2.  Selective coronary angiography:      Left main artery: The left main artery is a medium size, bifurcating vessel without significant angiographic disease.      LAD:  The left anterior descending artery is a medium size vessel that wraps around the apex and gives rise to three small diagonal branches.  The early mid LAD stent demonstrates severe 90% in-stent restenosis. The surrounding LAD is heavily calcified, diffusely through the stented segment.   Distal flow is TIMI2 with faint right-left apical collaterals present.     LCx: The left circumflex artery is a medium size vessel that gives rise to a mid, branching obtuse marginal.  Mild diffuse disease is noted in the proximal LCx into the OM branch.       RCA:  The right coronary artery is a medium size, dominant vessel that gives rise to a medium rPDA and rPL branch.  There is mild-moderate 40% mid diffuse narrowing.      INTERVENTIONAL PROCEDURE: Heparin was used for systemic anticoagulation, confirmed therapeutic by ACT measurement.  The LM artery was engaged with a 6F EBU3.5 guide catheter and a Runthrough wire was advanced to the apical LAD. After pre-dilating with a 2.0x15mm balloon, IVUS ([facility] [place]) was used to visualize the stentotic segment, which demonstrated diffuse, circumferential calcification of the stented portion of the LAD, as such, the stent appears under-sized.  A 2.5x12mm [place] cutting balloon was used modify the intra-luminal plaque/tomeka-intimal hyperplasia, followed by several cycles of intra-coronary lithotripsy with a 2.5x12mm Shockwave balloon- in hopes to modification the intra-mural calcium burden.  After pre-dilating further with a 2.5x20mm NC balloon, which fully expanded, a 2.5x28mm Synergy XD ANGELINA was deployed and post-dilated with a 3.0mm NC balloon. Repeat IVUS showed a  focal area of under-expansion, thus, that area was re-dilated with the 3.0mm NC balloon to higher pressure.  Care was taken to not to post-dilate too aggressively and cause vessel rupture, considering the substantial calcium burden.  Completion angiography demonstrated a good result with 0% residual stenosis, TIMI3 distal flow without vessel dissection/perforation.      MEDICATIONS:  See nursing record.     COMPLICATIONS:  No major complications were observed during this visit to the catheterization lab.     IMPRESSION:    1.  Left heart catheterization: LVEDP 7 mmHg, no aortic stenosis.  LVEF 55% with very mild geri-apical hypokinesis.  No significant mitral regurgitation  2.  Coronary angiography:  right dominant system  - LM: no significant angiographic disease  - LAD: 90% diffusely calcified in-stent restenosis  - LCx: mild diffuse disease  - RCA: mild-moderate diffuse disease of the mid vessel  3. Percutaneous coronary intervention:  Successful IVUS guided intra-coronary lithotripsy and PCI to the mid LAD in-stent lesion with a 2.5x28mm Synergy XD ANGELINA      RECOMMENDATIONS: DAPT (asa/ticagrelor) for a 12 month period considering ACS presentation.

## 2024-04-10 NOTE — CONSULTS
[facility]    Jennifer Barrios Patient Status:  Inpatient    10/19/1960 MRN [id number]   Location [id number]-[id number] PCP Ronan Escobedo MD     Date of Admission: 2024    History of Present Illness: Pt is a 63 year old male consulted for abnormal CT with h/o CAD s/p stent , DM, HTN, HLD remote smoking. Pt reports feeling well up until about 3-4 weeks ago when he noted increased chest pressure, similar to prior MI, during spin classes that would improve with cool down and rest. This has been persisting and worsening and prompted ICC eval. During w/u found to have elevated troponin. Pt also noted prior travel to [facility] about 2 months ago and due to concern for VTE had CTA chest showing NO PE but had RUL spiculated nodule for which I am consulted.   Pt has a 2 PY history of smoking. No occupational exposures that he knows off. Unknown TB status but denies any positive testing and unknown BCG vaccination.   He has had no prior CT Chest scans for comparison.   Otherwise pt denies hemoptysis, weight loss,, nausea, vomitting or diarrhea.       PMH:    Past Medical History:    Anxiety    CAD (coronary artery disease)    s/p MI with stent.  nuclear stress test (), small area ischemia    Calculus of kidney    Diabetes mellitus (HCC)    Hyperlipidemia    Hypertension    Overweight (BMI 25.0-29.9)    Personal history of colonic polyps    multiple       Past Surgical History:   Procedure Laterality Date    Angioplasty (coronary)  13    Promus ANGELINA to proximal/midportion of the left anterior descending    Colonoscopy  14  -NICK Wisdom    2 adenomas, diverticuli, hemorrhoids. repeat .    Colonoscopy  19= Polyp    Repeat     Colonoscopy N/A 2019    Procedure: COLONOSCOPY, POSSIBLE BIOPSY, POSSIBLE POLYPECTOMY 81154;  Surgeon: Arvind Santoyo MD;  [place] [facility] [place]    Colonoscopy,remv lesn,snare  10/31/2011    NICK Wisdom. multiple adenomas (6), hemorrhoids, repeat  2014.    Colonoscopy,remv lesn,snare N/A 9/25/2014    Procedure: ESOPHAGOGASTRODUODENOSCOPY, COLONOSCOPY, POSSIBLE BIOPSY, POSSIBLE POLYPECTOMY 03580,64090;  Surgeon: Dario Wisdom MD;  Location[facility]    Special service or report  7/18/2007    [facility]     Stent placemt retro carotid  2013    Upper gi endoscopy - referral  9/25/14  -NICK Wisdom    normal EGD    Upper gi endoscopy,diagnosis N/A 9/25/2014    Procedure: ESOPHAGOGASTRODUODENOSCOPY, COLONOSCOPY, POSSIBLE BIOPSY, POSSIBLE POLYPECTOMY 68060,87020;  Surgeon: Dario Wisdom MD;  Location: [facility]       Allergies: No Known Allergies    Medications:  Outpatient Medications:    No current facility-administered medications on file prior to encounter.     Current Outpatient Medications on File Prior to Encounter   Medication Sig Dispense Refill    atorvastatin 20 MG Oral Tab Take 1 tablet (20 mg total) by mouth nightly. TAKE AT BEDTIME 90 tablet 3    lisinopril 10 MG Oral Tab Take 1 tablet (10 mg total) by mouth daily. 90 tablet 3    metoprolol succinate 25 MG Oral Tablet 24 Hr Take 1 tablet (25 mg total) by mouth daily. 90 tablet 3    aspirin 81 MG Oral Tab Take 1 tablet (81 mg total) by mouth daily.      POTASSIUM CITRATE 1 tablet by Does not apply route daily.      Omega-3 Fatty Acids (FISH OIL) 1000 MG Oral Cap Take 1,000 mg by mouth daily.      B-COMPLEX OR daily      MAGNESIUM 250 MG OR TABS daily         Inpatient Medications:  Scheduled Medications:     aspirin  81 mg Oral Daily    atorvastatin  20 mg Oral Nightly    lisinopril  20 mg Oral Daily    metoprolol tartrate  25 mg Oral TID Beta Blocker/Cardiac    insulin aspart  1-5 Units Subcutaneous TID AC and HS     Infusing Medications:     continuous dose heparin 1,300 Units/hr (04/10/24 8300)     PRN Medications:    glucose **OR** glucose **OR** glucose-vitamin C **OR** dextrose **OR** glucose **OR** glucose **OR** glucose-vitamin C    Social History:    History   Smoking  Status    Former    Packs/day: 1.00    Years: 10.00    Types: Cigarettes   Smokeless Tobacco    Never       History   Alcohol Use    0.0 - 1.0 standard drinks of alcohol/week       History   Drug Use No     Work:Retired from [employer], 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    TB: None known  Asbestos: None known.   Radon: None known.       Family History   Problem Relation Age of Onset    Hypertension Father     Lipids Father       REVIEW OF SYSTEMS:   A comprehensive 10 point review of systems was completed.  Pertinent positives and negatives noted in the the HPI.    OBJECTIVE:  Temp (24hrs), Av.9 °F (36.6 °C), Min:97.5 °F (36.4 °C), Max:98.8 °F (37.1 °C)   BP (!) 89/68   Pulse 71   Temp 97.5 °F (36.4 °C) (Temporal)   Resp 22   Ht 175.3 cm (5' 9\")   Wt 197 lb 8.5 oz (89.6 kg)   SpO2 96%   BMI 29.17 kg/m²   O2: RA  General: NAD.   Neuro: Alert, no focal deficits.    HEENT: PERRL  Neck : No LAD  CV: RRR, nl S1, S2, no S4, S3 or murmur.   Lungs: Clear bilaterally.   Abd: Nontender, non distended.   Ext: No edema.   Skin: No rashes.     Labs:  Recent Labs   Lab 04/09/24  1407 04/10/24  040   WBC 6.9 6.3   HGB 17.8* 16.7   HCT 49.5 47.2   .0 192.0     Recent Labs   Lab 04/09/24  1407 04/10/24  0407   * 127*   BUN 19 16   CREATSERUM 0.70 0.75   CA 10.3* 9.4    141   K 3.8 4.4    107   CO2 24.0 26.0   AST 27  --    ALT 42  --    ALB 4.0  --      Recent Labs   Lab 04/09/24  1407 04/09/24  2038 04/10/24  0407   PTT 25.3 32.9 40.9*     No results for input(s): \"ABGPHT\", \"LXZORB1O\", \"SSBMP0W\", \"ABGHCO3\", \"SITE\", \"DEV\", \"THGB\" in the last 168 hours.    Imaging:  CT chest :  1. No evidence of pulmonary embolus.      2. Spiculated consolidation in the right upper lobe may be due to infectious process.  However, malignancy is also of consideration.  A follow-up CT chest in 1-2 months to re-evaluate may be done.          Chest images personally reviewed.       Assessment/Plan    Abnormal CT  RUL spiculated nodule with air present. Has mild smoking history and no other risk factors and I would be more suspiscous for scarring rather than malignancy.   However, I would recommend f/u CT scan in 3 months. Could consider further w/u with PET and or bx at that time if increasing in size vs surveillance  No current infectious sx.   CAD, unstable angina.   Per cardiology. No objection from pulm standpoint for stent and antiplatelet. May need to hold in 3 months if bx required.       My best regards,         Serge Rangel MD  [facility], [facility]

## 2024-04-10 NOTE — PLAN OF CARE
Assumed care of the pt at 0730, VSS, afebrile. New pulm consult with Dr. Rangel to bedside; review CXR and results with cardiologist; plan made for outpt CTA 3 most after cath lab/this admission for follow up. Hep gtt stopped per MD order on way to cath lab. Pt consented, bilat groins prepped and shaved, wife at bedside, updated with plan of care.    1100- Pt back from cath lab with TR band to right wrist. See flowsheet for further assessment. Pt using urinal without incident. Passed bedside swallow, no c/o any pain. Air removed per protocol out of TR band. Site soft, no bleeding noted. Pt up to bathroom, had BM. Saline locked patient after taking PO without incident. Pt wanted to go home today, MD said pt would stay overnight and home tomorrow.

## 2024-04-10 NOTE — PLAN OF CARE
Patient received from ED at 1740. A&O x4. VSS.   Heparin gtt infusing per ACS protocol.   Denies chest pain.   Plan for cath tomorrow morning.   NPO at midnight.   Patient and wife updated with plan of care.

## 2024-04-10 NOTE — PROGRESS NOTES
CC: follow-up hospital admission nstemi    SUBJECTIVE:  Interval History:     Sp angiogram  No cp, sob, n /v  Wife at bs    OBJECTIVE:  Scheduled Meds:    ticagrelor  90 mg Oral BID    [START ON 4/11/2024] aspirin  81 mg Oral Daily    aspirin  81 mg Oral Daily    atorvastatin  20 mg Oral Nightly    lisinopril  20 mg Oral Daily    metoprolol tartrate  25 mg Oral TID Beta Blocker/Cardiac    insulin aspart  1-5 Units Subcutaneous TID AC and HS     Continuous Infusions:   PRN Meds:   glucose **OR** glucose **OR** glucose-vitamin C **OR** dextrose **OR** glucose **OR** glucose **OR** glucose-vitamin C    PHYSICAL EXAM  Vital signs: Temp:  [97.5 °F (36.4 °C)-98.8 °F (37.1 °C)] 98.4 °F (36.9 °C)  Pulse:  [61-91] 66  Resp:  [16-32] 22  BP: ()/() 122/68  SpO2:  [92 %-99 %] 98 %      GENERAL - NAD, AAO  EYES- sclera anicteric,   HENT- normocephalic,   NECK - supple  CV- RRR  RESP - CTAB, normal resp effort  ABDOMEN- soft, NT/ND   EXT- no LE edema   PSYCH - normal mentation/ normal affect    Data Review:   Labs:   Recent Labs   Lab 04/09/24  1407 04/10/24  0406   WBC 6.9 6.3   HGB 17.8* 16.7   MCV 86.4 87.7   .0 192.0       Recent Labs   Lab 04/09/24  1407 04/10/24  0407    141   K 3.8 4.4    107   CO2 24.0 26.0   BUN 19 16   CREATSERUM 0.70 0.75   CA 10.3* 9.4   MG  --  2.2   * 127*       Recent Labs   Lab 04/09/24  1407   ALT 42   AST 27   ALB 4.0       Recent Labs   Lab 04/09/24  1823 04/09/24  2110 04/10/24  0635   PGLU 102* 177* 116*       IMPRESSION:    1.  Left heart catheterization: LVEDP 7 mmHg, no aortic stenosis.  LVEF 55% with very mild geri-apical hypokinesis.  No significant mitral regurgitation  2.  Coronary angiography:  right dominant system  - LM: no significant angiographic disease  - LAD: 90% diffusely calcified in-stent restenosis  - LCx: mild diffuse disease  - RCA: mild-moderate diffuse disease of the mid vessel  3. Percutaneous coronary intervention:  Successful  IVUS guided intra-coronary lithotripsy and PCI to the mid LAD in-stent lesion with a 2.5x28mm Synergy XD RASHEEDA       ASSESSMENT/PLAN:    Patient is a 63 year old male with PMH sig for cad with prior MI, stetns, dm2, htn , hld, here for chest pressure     Impression     -NSTEMI due to in stent restenosis   -CAD with prior NSTEMI in 2013 and rasheeda to LAD  -HTN  -HLD     -abn CT chest     -DM 2  -obesity  -hypercalcemia   -elevated trop         Plan     *NSTEMI  -presenting chest pain, elevated trop  Cath with in stent restenosis sp repeat PCI / RASHEEDA  DAPT for 12 months      *abn CT chest   -CTA done due to recent travel and ro PE, showing spiculated RUL consolidation. He denied any fevers / infectious symptoms, less likely infection. Very limited remote smoking hx. Pulm saw - possibly due to scarring. Rec repeat imaging in 3 months     *hypercalcemia  -mild, resolved, ? If was dry on admission      *dm  -iss / accuchecks      *chronic conditions  -home meds as able       Dispo - home likely in am    Will continue to follow while hospitalized. Please page me or the on-call hospitalist with questions or concerns.    Serg Barton Hospitalist  [phone]  Answering Service: [phone]

## 2024-04-11 VITALS
BODY MASS INDEX: 29.26 KG/M2 | RESPIRATION RATE: 27 BRPM | HEIGHT: 69 IN | DIASTOLIC BLOOD PRESSURE: 75 MMHG | TEMPERATURE: 98 F | WEIGHT: 197.56 LBS | OXYGEN SATURATION: 97 % | SYSTOLIC BLOOD PRESSURE: 142 MMHG | HEART RATE: 74 BPM

## 2024-04-11 LAB
ANION GAP SERPL CALC-SCNC: 7 MMOL/L (ref 0–18)
BUN BLD-MCNC: 15 MG/DL (ref 9–23)
CALCIUM BLD-MCNC: 9.6 MG/DL (ref 8.5–10.1)
CHLORIDE SERPL-SCNC: 107 MMOL/L (ref 98–112)
CO2 SERPL-SCNC: 25 MMOL/L (ref 21–32)
CREAT BLD-MCNC: 0.76 MG/DL
EGFRCR SERPLBLD CKD-EPI 2021: 101 ML/MIN/1.73M2 (ref 60–?)
ERYTHROCYTE [DISTWIDTH] IN BLOOD BY AUTOMATED COUNT: 12 %
GLUCOSE BLD-MCNC: 138 MG/DL (ref 70–99)
GLUCOSE BLD-MCNC: 139 MG/DL (ref 70–99)
HCT VFR BLD AUTO: 51.3 %
HGB BLD-MCNC: 17.3 G/DL
MAGNESIUM SERPL-MCNC: 2.2 MG/DL (ref 1.6–2.6)
MCH RBC QN AUTO: 30.5 PG (ref 26–34)
MCHC RBC AUTO-ENTMCNC: 33.7 G/DL (ref 31–37)
MCV RBC AUTO: 90.3 FL
OSMOLALITY SERPL CALC.SUM OF ELEC: 291 MOSM/KG (ref 275–295)
PLATELET # BLD AUTO: 185 10(3)UL (ref 150–450)
POTASSIUM SERPL-SCNC: 4.1 MMOL/L (ref 3.5–5.1)
PROCALCITONIN SERPL-MCNC: <0.05 NG/ML (ref ?–0.16)
RBC # BLD AUTO: 5.68 X10(6)UL
SODIUM SERPL-SCNC: 139 MMOL/L (ref 136–145)
WBC # BLD AUTO: 7.1 X10(3) UL (ref 4–11)

## 2024-04-11 PROCEDURE — 82962 GLUCOSE BLOOD TEST: CPT

## 2024-04-11 PROCEDURE — 80048 BASIC METABOLIC PNL TOTAL CA: CPT | Performed by: HOSPITALIST

## 2024-04-11 PROCEDURE — 85027 COMPLETE CBC AUTOMATED: CPT | Performed by: HOSPITALIST

## 2024-04-11 PROCEDURE — 83735 ASSAY OF MAGNESIUM: CPT | Performed by: HOSPITALIST

## 2024-04-11 PROCEDURE — 84145 PROCALCITONIN (PCT): CPT | Performed by: INTERNAL MEDICINE

## 2024-04-11 PROCEDURE — 99211 OFF/OP EST MAY X REQ PHY/QHP: CPT

## 2024-04-11 RX ORDER — LISINOPRIL 20 MG/1
20 TABLET ORAL DAILY
Qty: 30 TABLET | Refills: 1 | Status: SHIPPED | OUTPATIENT
Start: 2024-04-12

## 2024-04-11 NOTE — DISCHARGE SUMMARY
[facility] Discharge Summary    Patient ID  Jennifer Barrios  [id number]  63 year old  10/19/1960    Admit date: 4/9/2024    Discharge date: 04/11/24    Attending: Serg Mai DO     Primary Care Physician: Ronan Escobedo MD      Reason for admission: chest pain    Discharge condition: stable    Disposition: home    Important follow up:  -PCP within 7 d  -specialists:  cards 1-2 weeks         Additional patient instructions       Discharge med list     Medication List        ASK your doctor about these medications      aspirin 81 MG Tabs     atorvastatin 20 MG Tabs  Commonly known as: Lipitor  Take 1 tablet (20 mg total) by mouth nightly. TAKE AT BEDTIME     B-COMPLEX OR     lisinopril 10 MG Tabs  Commonly known as: Zestril  Take 1 tablet (10 mg total) by mouth daily.     magnesium 250 MG Tabs     metoprolol succinate ER 25 MG Tb24  Commonly known as: Toprol XL  Take 1 tablet (25 mg total) by mouth daily.     omega-3 fatty acids 1000 MG Caps  Commonly known as: Fish Oil     POTASSIUM CITRATE              Discharge Diagnoses:    -NSTEMI due to in stent restenosis   -CAD with prior NSTEMI in 2013 and rasheeda to LAD  -HTN  -HLD     -abn CT chest - RUL spiculated nodule / mass     -DM 2  -obesity  -hypercalcemia   -elevated trop       Consults:  IP CONSULT TO CARDIOLOGY  IP CONSULT TO HOSPITALIST  IP CONSULT TO PULMONOLOGY  IP CONSULT TO CARDIAC REHAB  IP CONSULT TO FOOD AND NUTRITION SERVICES    Radiology:  CATH ANGIO    Result Date: 4/10/2024  This exam has been completed. Please refer to Notes for the results to this procedure.    CT ANGIOGRAPHY, CHEST (CPT=71275)    Result Date: 4/9/2024  PROCEDURE:  CT ANGIOGRAPHY, CHEST (CPT=71275)  COMPARISON:  [facility] , XR, XR CHEST AP PORTABLE  (CPT=71045), 4/09/2024, 2:33 PM.  INDICATIONS:  chest pain while exercising.  pt sent from [facility]  TECHNIQUE:  IV contrast-enhanced multislice CT angiography is performed through the pulmonary arterial anatomy. 3D volume renderings are  generated.  Dose reduction techniques were used. Dose information is transmitted to the ACR (American College of Radiology) NRDR (National Radiology Data Registry) which includes the Dose Index Registry.  PATIENT STATED HISTORY:(As transcribed by Technologist)  Patient is here for chest pressure while exercising.   CONTRAST USED:  100cc of Omnipaque 350  FINDINGS:  VASCULATURE:  No visible pulmonary arterial thrombus or attenuation.  THORACIC AORTA:  No aneurysm or visible dissection.  LUNGS:  Spiculated consolidation right upper lobe (image 73). MEDIASTINUM:  No adenopathy or mass.  PRACHI:  No mass or adenopathy.  CARDIAC:  Dense coronary arterial calcifications. PLEURA:  No mass or effusion.  CHEST WALL:  No mass or axillary adenopathy.  LIMITED ABDOMEN:  Gallstones in the gallbladder. BONES:  Diffuse idiopathic skeletal hyperostosis. OTHER:  Negative.             CONCLUSION:   1. No evidence of pulmonary embolus.  2. Spiculated consolidation in the right upper lobe may be due to infectious process.  However, malignancy is also of consideration.  A follow-up CT chest in 1-2 months to re-evaluate may be done.     LOCATION:  [facility]   Dictated by (CST): Berry Ward MD on 4/09/2024 at 4:26 PM     Finalized by (CST): Berry Ward MD on 4/09/2024 at 4:28 PM       XR CHEST AP PORTABLE  (CPT=71045)    Result Date: 4/9/2024  PROCEDURE:  XR CHEST AP PORTABLE  (CPT=71045)  TECHNIQUE:  AP chest radiograph was obtained.  COMPARISON:  [facility] , XR, XR CHEST AP PORTABLE  (CPT=71010), 6/18/2013, 4:27 PM.  INDICATIONS:  chest pain while exercising.  pt sent from [facility]  PATIENT STATED HISTORY: (As transcribed by Technologist)  Patient offered no additional history at this time.                 CONCLUSION:   Stable cardiac and mediastinal contours.  Stable prominence of the central pulmonary vasculature without overt pulmonary edema.  No airspace consolidation.  The pleural spaces are clear.   LOCATION:  [id number]       Dictated by (CST): Lauren Magdaleno MD on 4/09/2024 at 2:39 PM     Finalized by (CST): Lauren Magdaleno MD on 4/09/2024 at 2:40 PM         Operative reports:      Hospital course:    *NSTEMI  -presenting chest pain, elevated trop  Cath with in stent restenosis sp repeat PCI / ANGELINA on 04/10/24  DAPT for 12 months      *abn CT chest   -CTA done due to recent travel and ro PE, showing spiculated RUL consolidation. He denied any fevers / infectious symptoms, less likely infection. Very limited remote smoking hx. Pulm saw - possibly due to scarring. Rec repeat imaging in 3 months. May need biopy if having radigraphic changes     *hypercalcemia  -mild, resolved, ? If was dry on admission      *dm  -iss / accuchecks      *chronic conditions  -home meds as able    Day of discharge exam:  Vitals:    04/11/24 0800   BP: 142/75   Pulse: 74   Resp: (!) 27   Temp: 97.9 °F (36.6 °C)     Feels well, no cp or sob  No nv    No acute distress, alert and oriented   Lungs clear  Heart regular  Abdomen benign    Total time coordinating care 35 min       Patient and/or family had opportunity to ask questions and expressed understanding and agreement with therapeutic plan as outlined         Serg Barton Hospitalist  [phone]  Answering Service: [phone]

## 2024-04-11 NOTE — PLAN OF CARE
Assumed care of the pt at 0730, VSS, afebrile, no c/o any pain. Crystal Gooden and Sergei to bedside to see pt this AM.  All state can go home, with instructions given. Cardiac rehab in to see pt and wife with education as well.  Contacted [facility] to discharge AVS for home. Walked in halls with RN, able to get around at baseline.  IV's removed, taken off tele, no further questions, sent home.

## 2024-04-11 NOTE — CARDIAC REHAB
Cardiac rehab education completed with patient and wife  Stent card given  Patient referred to cardiac rehab  Patient to call for appt--number provided

## 2024-04-11 NOTE — PROGRESS NOTES
[facility] Cardiology Progress Note        Jennifer TERESE Sophie Patient Status:  Inpatient    10/19/1960 MRN [id number]   [facility] 6NE-A Attending Serg Mai,    Hosp Day # 2 PCP Ronan Escobedo MD     Subjective:  The patient denies  chest pain and shortness of breath.    Medications:   ticagrelor  90 mg Oral BID    aspirin  81 mg Oral Daily    atorvastatin  20 mg Oral Nightly    lisinopril  20 mg Oral Daily    metoprolol tartrate  25 mg Oral TID Beta Blocker/Cardiac    insulin aspart  1-5 Units Subcutaneous TID AC and HS       Continuous Infusions:          Allergies:  No Known Allergies      Objective:        Intake/Output:      Intake/Output Summary (Last 24 hours) at 2024 1019  Last data filed at 2024 0800  Gross per 24 hour   Intake 490 ml   Output 750 ml   Net -260 ml     Wt Readings from Last 3 Encounters:   24 197 lb 8.5 oz (89.6 kg)   21 198 lb (89.8 kg)   21 196 lb (88.9 kg)       Physical Exam:        Vitals:    24 0500 24 0600 24 0700 24 0800   BP: 108/58 115/56 127/75 142/75   BP Location:    Left arm   Pulse: 74 72 72 74   Resp: 20 20 25 (!) 27   Temp:    97.9 °F (36.6 °C)   TempSrc:    Temporal   SpO2: 96% 94% 97% 97%   Weight:       Height:           Temp:  [97.2 °F (36.2 °C)-98.4 °F (36.9 °C)] 97.9 °F (36.6 °C)  Pulse:  [64-82] 74  Resp:  [20-32] 27  BP: (102-153)/(49-90) 142/75  SpO2:  [94 %-99 %] 97 %      Temp: 97.9 °F (36.6 °C)  Pulse: 74  Resp: 27  BP: 142/75  General:  Appears comfortable  HEENT: No focal deficits.  Neck: No JVD, carotids 2+ no bruits.  Cardiac: Regular S1S2.  No S3, S4, rub, click.  No murmur.  Lungs: Clear to auscultation and percussion.  Abdomen: Soft, non-tender.   Extremities: No LE edema.  No clubbing or cyanosis.    Neurologic: Alert and oriented, normal affect.  Skin: Warm and dry.           LABS:      HEM:  Recent Labs   Lab 24  1407 04/10/24  0406 24  0406   WBC 6.9  6.3 7.1   HGB 17.8* 16.7 17.3   HCT 49.5 47.2 51.3   .0 192.0 185.0       Chem:  Recent Labs   Lab 04/09/24  1407 04/10/24  0407 04/11/24  0406    141 139   K 3.8 4.4 4.1    107 107   CO2 24.0 26.0 25.0   BUN 19 16 15   CREATSERUM 0.70 0.75 0.76   CA 10.3* 9.4 9.6   MG  --  2.2 2.2   * 127* 138*       Recent Labs   Lab 04/09/24  1407   ALT 42   AST 27   ALB 4.0       Recent Labs   Lab 04/09/24  1407 04/09/24  2038 04/10/24  0407   PTT 25.3 32.9 40.9*           Lab Results   Component Value Date    TROP 1.420 (H) 06/19/2013    TROP 1.890 (H) 06/19/2013    TROP 1.950 (H) 06/18/2013         Invalid input(s): \"PBNPML\"                       Diagnostics:   Telemetry: SR    EKG, 4/9/2024:  NSR, modest anterior ST elevation     CXR, 4/9/2024:  CONCLUSION:       Stable cardiac and mediastinal contours.  Stable prominence of the central pulmonary vasculature without overt pulmonary edema.  No airspace consolidation.  The pleural spaces are clear.        trop's: 950-->1102---> 739           Impression:     1. CP/NSTEMI; hx PCI to LAD, 2013---> ANGELINA mid LAD--> [facility] (4/10/24) 90% LAD ISR s/p cutting balloon/shockwave and 2.5x28mm ANGELINA     2. Dyslipidemia      3. Hypertension       4. Abnormal CTA chest: Spiculated consolidation in the right upper lobe may be due to infectious process.  pulmonary consultation--> repeat CT scan 3 months.     Recommend:     1.  s/p PCI to LAD ISR yesterday, remains CP free.  Reviewed results with pt.    2. asa/ticagrelor (1yr considering ACS presentation), BB, statin.  3. spiculated lung nodule on CTA: plan for repeat CT imaging 3 months, per pulmonary  4. ok for dc from cv standpoint, f/u 1-2 weeks with Dr. Gant or [facility].        Prashant Barton MD

## 2024-04-11 NOTE — PROGRESS NOTES
[facility]    Jennifer Barrios Patient Status:  Inpatient    10/19/1960 MRN [id number]   Location [facility] 6NE-A Attending Serg Mai, DO   Hosp Day # 2 PCP Ronan Escobedo MD     Critical Care Progress Note     Date of Admission: 2024  2:37 PM  Admission Diagnosis: ACS (acute coronary syndrome) (HCC) [I24.9]     S:  Pt s/p cath yesterday with IVUS guided intra-coronary lithotripsy and pci to the mid LAD in stent lesion with a  ANGELINA.  He denies respiratory symptoms today.       Current Medications:    Current Facility-Administered Medications:     ticagrelor (Brilinta) tab 90 mg, 90 mg, Oral, BID    aspirin DR tab 81 mg, 81 mg, Oral, Daily    atorvastatin (Lipitor) tab 20 mg, 20 mg, Oral, Nightly    lisinopril (Prinivil; Zestril) tab 20 mg, 20 mg, Oral, Daily    metoprolol tartrate (Lopressor) tab 25 mg, 25 mg, Oral, TID Beta Blocker/Cardiac    glucose (Dex4) 15 GM/59ML oral liquid 15 g, 15 g, Oral, Q15 Min PRN **OR** glucose (Glutose) 40% oral gel 15 g, 15 g, Oral, Q15 Min PRN **OR** glucose-vitamin C (Dex-4) chewable tab 4 tablet, 4 tablet, Oral, Q15 Min PRN **OR** dextrose 50% injection 50 mL, 50 mL, Intravenous, Q15 Min PRN **OR** glucose (Dex4) 15 GM/59ML oral liquid 30 g, 30 g, Oral, Q15 Min PRN **OR** glucose (Glutose) 40% oral gel 30 g, 30 g, Oral, Q15 Min PRN **OR** glucose-vitamin C (Dex-4) chewable tab 8 tablet, 8 tablet, Oral, Q15 Min PRN    insulin aspart (NovoLOG) 100 Units/mL FlexPen 1-5 Units, 1-5 Units, Subcutaneous, TID AC and HS     OBJECTIVE:  /56   Pulse 72   Temp 97.2 °F (36.2 °C) (Temporal)   Resp 20   Ht 5' 9\" (1.753 m)   Wt 197 lb 8.5 oz (89.6 kg)   SpO2 94%   BMI 29.17 kg/m²      Ventilator Settings: RA      Wt Readings from Last 3 Encounters:   24 197 lb 8.5 oz (89.6 kg)   21 198 lb (89.8 kg)   21 196 lb (88.9 kg)        I/O last 3 completed shifts:  In: 1017 [P.O.:240; I.V.:777]  Out: 2325 [Urine:2325]  No intake/output data recorded.       Physical Exam:                          General: alert, cooperative, in NAD                          HEENT: oropharynx clear without erythema or exudates, moist mucous membranes                          Lungs: Clear to auscultation bilaterally, no wheezes or crackles                           Chest wall: No tenderness or deformity.                          Heart: Regular rate and rhythm, normal S1S2                          Abdomen: soft, non-tender, non-distended, positive BS.                          Extremity: No clubbing or cyanosis. no edema                          Skin: No rashes or lesions.       Lab Results   Component Value Date    WBC 7.1 04/11/2024    RBC 5.68 04/11/2024    HGB 17.3 04/11/2024    HCT 51.3 04/11/2024    MCV 90.3 04/11/2024    MCH 30.5 04/11/2024    MCHC 33.7 04/11/2024    RDW 12.0 04/11/2024    .0 04/11/2024     Lab Results   Component Value Date     04/11/2024    K 4.1 04/11/2024     04/11/2024    CO2 25.0 04/11/2024    BUN 15 04/11/2024    CREATSERUM 0.76 04/11/2024     04/11/2024    CA 9.6 04/11/2024     No results found for: \"PT\", \"INR\"        Imaging: I have independently visualized all relevant chest imaging in PACS.  I agree with the radiology interpretation except where noted.  CT chest 4/9:  1. No evidence of pulmonary embolus.   2. Spiculated consolidation in the right upper lobe may be due to infectious process.  However, malignancy is also of consideration.  A follow-up CT chest in 1-2 months to re-evaluate may be done.          Chest images personally reviewed.      ASSESSMENT/PLAN:  Abnormal CT  -RUL spiculated nodule with air present, differential includes infection, scar, vs malignancy  -given size and spiculated nature would favor OP PET scan  -Could consider further workup including biopsy pending PET scan results  -No current infectious sx. Check PCT  CAD, unstable angina.   -Per cardiology.   -cath 4/10 with IVUS guided intra-coronary  lithotripsy and pci to the mid LAD in stent lesion with a  ANGELINA  Dispo:  -stable from pulm perspective for discharge  -can follow up with pulm as OP in 1-2 weeks, will sign off    Keerthi Rojas MD  4/11/2024  8:21 AM

## (undated) NOTE — LETTER
[address]  [address]  Consent for Procedure/Sedation  Date: 4/10/2024         Time: 0800    I hereby authorize Dr. Dorado/Oralia TOLBERT and or his associates, my physician and his/her assistants (if applicable), which may include medical students, residents, and/or fellows, to perform the following surgical operation/ procedure and administer such anesthesia as may be determined necessary by my physician:  Operation/Procedure name (s) Peripheral angiography, atherectomy, percutaneous transluminal angioplasty (PTA) and/or vascular stenting on [place] E Sophie  2.   I recognize that during the surgical operation/procedure, unforeseen conditions may necessitate additional or different procedures than those listed above.  I, therefore, further authorize and request that the above-named surgeon, assistants, or designees perform such procedures as are, in their judgment, necessary and desirable.    3.   My surgeon/physician has discussed prior to my surgery the potential benefits, risks and side effects of this procedure; the likelihood of achieving goals; and potential problems that might occur during recuperation.  They also discussed reasonable alternatives to the procedure, including risks, benefits, and side effects related to the alternatives and risks related to not receiving this procedure.  I have had all my questions answered and I acknowledge that no guarantee has been made as to the result that may be obtained.    4.   Should the need arise during my operation/procedure, which includes change of level of care prior to discharge, I also consent to the administration of blood and/or blood products.  Further, I understand that despite careful testing and screening of blood or blood products by collecting agencies, I may still be subject to ill effects as a result of receiving a blood transfusion and/or blood products.  The following are some, but not all, of the potential risks that can  occur: fever and allergic reactions, hemolytic reactions, transmission of diseases such as Hepatitis, AIDS and Cytomegalovirus (CMV) and fluid overload.  In the event that I wish to have an autologous transfusion of my own blood, or a directed donor transfusion, I will discuss this with my physician.     Check only if Refusing Blood or Blood Products  I understand refusal of blood or blood products as deemed necessary by my physician may have serious consequences to my condition to include possible death. I hereby assume responsibility for my refusal and release the hospital, its personnel, and my physicians from any responsibility for the consequences of my refusal.      o  Refuse        5.   I authorize the use of any specimen, organs, tissues, body parts or foreign objects that may be removed from my body during the operation/procedure for diagnosis, research or teaching purposes and their subsequent disposal by hospital authorities.  I also authorize the release of specimen test results and/or written reports to my treating physician on the hospital medical staff or other referring or consulting physicians involved in my care, at the discretion of the Pathologist or my treating physician.    6.   I consent to the photographing or videotaping of the operations or procedures to be performed, including appropriate portions of my body for medical, scientific, or educational purposes, provided my identity is not revealed by the pictures or by descriptive texts accompanying them.  If the procedure has been photographed/videotaped, the surgeon will obtain the original picture, image, videotape or CD.  The hospital will not be responsible for storage, release or maintenance of the picture, image, tape or CD.    7.   I consent to the presence of a  or observers in the operating room as deemed necessary by my physician or their designees.    8.   I recognize that in the event my procedure results in  extended X-Ray/fluoroscopy time, I may develop a skin reaction.    9. If I have a Do Not Attempt Resuscitation (DNAR) order in place, that status will be suspended while in the operating room, procedural suite, and during the recovery period unless otherwise explicitly stated by me (or a person authorized to consent on my behalf). The surgeon or my attending physician will determine when the applicable recovery period ends for purposes of reinstating the DNAR order.  10. Patients having a sterilization procedure: I understand that if the procedure is successful the results will be permanent and it will therefore be impossible for me to inseminate, conceive, or bear children.  I also understand that the procedure is intended to result in sterility, although the result has not been guaranteed.   11. I acknowledge that my physician has explained sedation/analgesia administration to me including the risk and benefits I consent to the administration of sedation/analgesia as may be necessary or desirable in the judgment of my physician.    I CERTIFY THAT I HAVE READ AND FULLY UNDERSTAND THE ABOVE CONSENT TO OPERATION and/or OTHER PROCEDURE.      ____________________________________       _________________________________      ______________________________  Signature of Patient         Signature of Responsible Person        Printed Name of Responsible Person    ____________________________________      _________________________________      ______________________________       Signature of Witness          Relationship to Patient                       Date                                         Time  Patient Name: Jennifer Barrios  : 10/19/1960    Reviewed: 2024   Printed: April 10, 2024  Medical Record #: [id number] Page 1 of 1